# Patient Record
Sex: MALE | Race: WHITE | URBAN - METROPOLITAN AREA
[De-identification: names, ages, dates, MRNs, and addresses within clinical notes are randomized per-mention and may not be internally consistent; named-entity substitution may affect disease eponyms.]

---

## 2020-01-13 NOTE — PROGRESS NOTES
Arpan Conti   26 y.o. male   1990    HPI:  Chief Complaint   Patient presents with    New Patient     This is patient's first visit with me. He is originally moved from [facility] about a year ago and works for the [employer] in [employer]. Patient's main problems is GERD, which started in 2012. Has relief with Omeprazole, which he takes on a nearly daily basis. Main trigger is tomato based food. Has few nighttime awakenings. No referral was made to GI for a EGD. No history of HTN or other medical issues that required chronic meds. STOP-BANG:  -Snoring - no  -Tired frequently - no  -Observed apnea - unsure (patient is single)  -Pressure (blood) - no  -BMI > 35 - yes  -Age > 50 - no  -Neck circumference - did not perform (Male - > 37 cm)-Gender - male    STOP-BANG risk score = 2 (0-2 = low; 3-4 = intermediate; 5 or higher = high-risk)    [place] sleepiness score = normal sleepiness    FMH - yes     No Known Allergies  Current Outpatient Medications on File Prior to Visit   Medication Sig Dispense Refill    Multiple Vitamins-Minerals (ADULT ONE DAILY GUMMIES PO) Take by mouth       No current facility-administered medications on file prior to visit. Family History   Problem Relation Age of Onset    Cancer Maternal Grandfather         prostate, bone    Atrial Fibrillation Paternal Grandmother     Other Paternal Grandmother         colitis    Diabetes Maternal Uncle     Cancer Paternal Great Grandmother         esophageal cancer     Social History     Tobacco Use    Smoking status: Never Smoker    Smokeless tobacco: Never Used   Substance Use Topics    Alcohol use: Not Currently      Review of Systems   Constitutional: Negative for activity change, appetite change, fatigue, fever and unexpected weight change. HENT: Negative for congestion, rhinorrhea, sinus pressure and trouble swallowing.     Respiratory: Negative for cough, chest tightness, shortness of breath and wheezing. Cardiovascular: Negative for chest pain, palpitations and leg swelling. Gastrointestinal: Negative for abdominal distention, abdominal pain, blood in stool, constipation, diarrhea, nausea and vomiting. Genitourinary: Negative for dysuria, frequency and hematuria. Musculoskeletal: Negative for arthralgias and back pain. Skin: Negative for rash. Neurological: Negative for dizziness, weakness, light-headedness, numbness and headaches. Wt Readings from Last 3 Encounters:   01/16/20 259 lb (117.5 kg)     BP Readings from Last 3 Encounters:   01/16/20 110/78     Pulse Readings from Last 3 Encounters:   01/16/20 88     /78   Pulse 88   Resp 18   Ht 5' 8\" (1.727 m)   Wt 259 lb (117.5 kg)   SpO2 98%   BMI 39.38 kg/m²    Physical Exam  Vitals signs reviewed. Constitutional:       General: He is not in acute distress. Appearance: He is obese. He is not ill-appearing. HENT:      Head: Normocephalic and atraumatic. Right Ear: External ear normal.      Left Ear: External ear normal.      Nose: Nose normal.   Eyes:      Extraocular Movements: Extraocular movements intact. Conjunctiva/sclera: Conjunctivae normal.   Neck:      Musculoskeletal: Normal range of motion. Cardiovascular:      Rate and Rhythm: Normal rate and regular rhythm. Pulmonary:      Effort: Pulmonary effort is normal. No respiratory distress. Breath sounds: No wheezing, rhonchi or rales. Abdominal:      General: Abdomen is flat. There is no distension. Palpations: Abdomen is soft. Musculoskeletal: Normal range of motion. General: No deformity. Right lower leg: No edema. Left lower leg: No edema. Skin:     Coloration: Skin is not jaundiced or pale. Findings: No erythema or rash. Neurological:      General: No focal deficit present. Mental Status: He is alert. Mental status is at baseline.    Psychiatric:         Mood and Affect: Mood normal.         Behavior:

## 2020-01-16 ENCOUNTER — OFFICE VISIT (OUTPATIENT)
Dept: FAMILY MEDICINE CLINIC | Age: 30
End: 2020-01-16
Payer: COMMERCIAL

## 2020-01-16 VITALS
WEIGHT: 259 LBS | RESPIRATION RATE: 18 BRPM | SYSTOLIC BLOOD PRESSURE: 110 MMHG | BODY MASS INDEX: 39.25 KG/M2 | HEIGHT: 68 IN | DIASTOLIC BLOOD PRESSURE: 78 MMHG | OXYGEN SATURATION: 98 % | HEART RATE: 88 BPM

## 2020-01-16 PROBLEM — K21.9 GASTROESOPHAGEAL REFLUX DISEASE WITHOUT ESOPHAGITIS: Status: ACTIVE | Noted: 2020-01-16

## 2020-01-16 PROCEDURE — 90471 IMMUNIZATION ADMIN: CPT | Performed by: FAMILY MEDICINE

## 2020-01-16 PROCEDURE — 99204 OFFICE O/P NEW MOD 45 MIN: CPT | Performed by: FAMILY MEDICINE

## 2020-01-16 PROCEDURE — 90688 IIV4 VACCINE SPLT 0.5 ML IM: CPT | Performed by: FAMILY MEDICINE

## 2020-01-16 RX ORDER — OMEPRAZOLE 40 MG/1
CAPSULE, DELAYED RELEASE ORAL
COMMUNITY
Start: 2019-12-20 | End: 2020-01-16 | Stop reason: SDUPTHER

## 2020-01-16 RX ORDER — OMEPRAZOLE 40 MG/1
40 CAPSULE, DELAYED RELEASE ORAL DAILY
Qty: 90 CAPSULE | Refills: 1 | Status: SHIPPED | OUTPATIENT
Start: 2020-01-16 | End: 2020-07-20 | Stop reason: SDUPTHER

## 2020-01-16 RX ORDER — TOPIRAMATE 25 MG/1
25 TABLET ORAL NIGHTLY
Qty: 30 TABLET | Refills: 2 | Status: SHIPPED | OUTPATIENT
Start: 2020-01-16 | End: 2020-12-28

## 2020-01-16 ASSESSMENT — ENCOUNTER SYMPTOMS
WHEEZING: 0
TROUBLE SWALLOWING: 0
SHORTNESS OF BREATH: 0
SINUS PRESSURE: 0
BACK PAIN: 0
COUGH: 0
ABDOMINAL DISTENTION: 0
CONSTIPATION: 0
CHEST TIGHTNESS: 0
DIARRHEA: 0
RHINORRHEA: 0
BLOOD IN STOOL: 0
ABDOMINAL PAIN: 0
VOMITING: 0
NAUSEA: 0

## 2020-01-16 NOTE — PATIENT INSTRUCTIONS
Patient Education        Gastroesophageal Reflux Disease (GERD): Care Instructions  Your Care Instructions    Gastroesophageal reflux disease (GERD) is the backward flow of stomach acid into the esophagus. The esophagus is the tube that leads from your throat to your stomach. A one-way valve prevents the stomach acid from moving up into this tube. When you have GERD, this valve does not close tightly enough. If you have mild GERD symptoms including heartburn, you may be able to control the problem with antacids or over-the-counter medicine. Changing your diet, losing weight, and making other lifestyle changes can also help reduce symptoms. Follow-up care is a key part of your treatment and safety. Be sure to make and go to all appointments, and call your doctor if you are having problems. It's also a good idea to know your test results and keep a list of the medicines you take. How can you care for yourself at home? · Take your medicines exactly as prescribed. Call your doctor if you think you are having a problem with your medicine. · Your doctor may recommend over-the-counter medicine. For mild or occasional indigestion, antacids, such as Tums, Gaviscon, Mylanta, or Maalox, may help. Your doctor also may recommend over-the-counter acid reducers, such as Pepcid AC, Tagamet HB, Zantac 75, or Prilosec. Read and follow all instructions on the label. If you use these medicines often, talk with your doctor. · Change your eating habits. ? It's best to eat several small meals instead of two or three large meals. ? After you eat, wait 2 to 3 hours before you lie down. ? Chocolate, mint, and alcohol can make GERD worse. ? Spicy foods, foods that have a lot of acid (like tomatoes and oranges), and coffee can make GERD symptoms worse in some people. If your symptoms are worse after you eat a certain food, you may want to stop eating that food to see if your symptoms get better.   · Do not smoke or chew tobacco. Smoking can make GERD worse. If you need help quitting, talk to your doctor about stop-smoking programs and medicines. These can increase your chances of quitting for good. · If you have GERD symptoms at night, raise the head of your bed 6 to 8 inches by putting the frame on blocks or placing a foam wedge under the head of your mattress. (Adding extra pillows does not work.)  · Do not wear tight clothing around your middle. · Lose weight if you need to. Losing just 5 to 10 pounds can help. When should you call for help? Call your doctor now or seek immediate medical care if:    · You have new or different belly pain.     · Your stools are black and tarlike or have streaks of blood.    Watch closely for changes in your health, and be sure to contact your doctor if:    · Your symptoms have not improved after 2 days.     · Food seems to catch in your throat or chest.   Where can you learn more? Go to https://"[employer].At [employer]. org and sign in to your [employer] account. Enter F822 in the [employer] box to learn more about \"Gastroesophageal Reflux Disease (GERD): Care Instructions. \"     If you do not have an account, please click on the \"Sign Up Now\" link. Current as of: August 11, 2019  Content Version: 12.3  © 3246-9453 Healthwise, Incorporated. Care instructions adapted under license by [facility] [place] ([facility]). If you have questions about a medical condition or this instruction, always ask your healthcare professional. [place] [address] any warranty or liability for your use of this information. Patient Education        Learning About the Mediterranean Diet  What is the [address]? The Mediterranean diet is a style of eating rather than a diet plan. It features foods eaten in [place], [place], [place] and [place], and other countries along the [place].  It emphasizes eating foods like fish, fruits, vegetables, beans, high-fiber breads and whole grains, meat. Brush the fish with olive oil, and broil or grill it. · Sprinkle your salad with seeds or nuts instead of cheese. · Cook with olive or canola oil instead of butter or oils that are high in saturated fat. · Switch from 2% milk or whole milk to 1% or fat-free milk. · Dip raw vegetables in a vinaigrette dressing or hummus instead of dips made from mayonnaise or sour cream.  · Have a piece of fruit for dessert instead of a piece of cake. Try baked apples, or have some dried fruit. Tips for eating out  · Try broiled, grilled, baked, or poached fish instead of having it fried or breaded. · Ask your  to have your meals prepared with olive oil instead of butter. · Order dishes made with marinara sauce or sauces made from olive oil. Avoid sauces made from cream or mayonnaise. · Choose whole-grain breads, whole wheat pasta and pizza crust, brown rice, beans, and lentils. · Cut back on butter or margarine on bread. Instead, you can dip your bread in a small amount of olive oil. · Ask for a side salad or grilled vegetables instead of french fries or chips. Where can you learn more? Go to https://[employer]eng[employer]. org and sign in to your [employer] account. Enter [phone] in the [facility] box to learn more about \"Learning About the Mediterranean Diet. \"     If you do not have an account, please click on the \"Sign Up Now\" link. Current as of: August 21, 2019  Content Version: 12.3  © 3393-0680 Healthwise, [employer]. Care instructions adapted under license by [facility] ([facility]). If you have questions about a medical condition or this instruction, always ask your healthcare professional. [address]  any warranty or liability for your use of this information.

## 2020-01-16 NOTE — PROGRESS NOTES
Vaccine Information Sheet, \"Influenza - Inactivated\"  given to Arpan Conti, or parent/legal guardian of  Arpan Conti and verbalized understanding. Patient responses:    Have you ever had a reaction to a flu vaccine? No  Do you have any current illness? No  Have you ever had Guillian Cossayuna Syndrome? No  Do you have a serious allergy to any of the follow: Neomycin, Polymyxin, Thimerosal, eggs or egg products? No    Flu vaccine given per order. Please see immunization tab. Risks and benefits explained. Current VIS given.       Immunizations Administered     Name Date Dose Route    Influenza, Quadv, IM, (6 mo and older Fluzone, Flulaval, Fluarix and 3 yrs and older Afluria) 1/16/2020 0.5 mL Intramuscular    Site: Deltoid- Left    Lot: [id number]    NDC: 41306-032-12

## 2020-04-07 ENCOUNTER — TELEPHONE (OUTPATIENT)
Dept: FAMILY MEDICINE CLINIC | Age: 30
End: 2020-04-07

## 2020-07-10 NOTE — PROGRESS NOTES
Kiran Quiet   26 y.o. male   1990    HPI:  Chief Complaint   Patient presents with    Gastroesophageal Reflux     Virtual visit encounter type:  [x] Doxy. me  [] Telephone w/o video  [] MyChart  [] Other: This patient was last seen by me on 1/16/2020. This was his very first visit with me to establish care as his new PCP. During that visit, I started him on omeprazole 40 mg daily for GERD and topiramate 25 mg to help promote weight loss. Patient stated that he for the most part takes omeprazole 40 mg on a daily basis to control his GERD, but it is effective if he continues to take it. He he said that his GERD is overall stable and not worse since his last visit. Regarding taking topiramate, patient stated that 2 days after he started taking the medication that he started feeling sick with migraines, nasal congestion, sore throat, etc.  H said that it took him over 4 weeks to fully recover. He was not sure if this was related to the medication so he stopped. No other issues or concerns were brought up by patient during today's visit. No Known Allergies     Current Outpatient Medications on File Prior to Visit   Medication Sig Dispense Refill    Multiple Vitamins-Minerals (ADULT ONE DAILY GUMMIES PO) Take by mouth      omeprazole (PRILOSEC) 40 MG delayed release capsule Take 1 capsule by mouth daily 90 capsule 1    topiramate (TOPAMAX) 25 MG tablet Take 1 tablet by mouth nightly (Patient not taking: Reported on 7/13/2020) 30 tablet 2     No current facility-administered medications on file prior to visit.        Family History   Problem Relation Age of Onset    Cancer Maternal Grandfather         prostate, bone    Atrial Fibrillation Paternal Grandmother     Other Paternal Grandmother         colitis    Diabetes Maternal Uncle     Cancer Paternal Great Grandmother         esophageal cancer     Social History     Tobacco Use    Smoking status: Never Smoker    Smokeless tobacco: Never Used   Substance Use Topics    Alcohol use: Not Currently      Review of Systems   Constitutional: Negative for activity change, appetite change, fatigue, fever and unexpected weight change. HENT: Negative for congestion, rhinorrhea, sinus pressure and trouble swallowing. Respiratory: Negative for cough, chest tightness, shortness of breath and wheezing. Cardiovascular: Negative for chest pain, palpitations and leg swelling. Gastrointestinal: Negative for abdominal distention, abdominal pain, blood in stool, constipation, diarrhea, nausea and vomiting. Genitourinary: Negative for dysuria, frequency and hematuria. Musculoskeletal: Negative for arthralgias and back pain. Skin: Negative for rash. Neurological: Negative for dizziness, weakness, light-headedness, numbness and headaches. Wt Readings from Last 3 Encounters:   01/16/20 259 lb (117.5 kg)     BP Readings from Last 3 Encounters:   01/16/20 110/78     Pulse Readings from Last 3 Encounters:   01/16/20 88     There were no vitals taken for this visit. Physical Exam  Constitutional:       General: He is awake. He is not in acute distress. Appearance: Normal appearance. He is obese. He is not ill-appearing. HENT:      Head: Normocephalic and atraumatic. Right Ear: External ear normal.      Left Ear: External ear normal.      Nose: Nose normal.   Eyes:      General: No scleral icterus. Right eye: No discharge. Left eye: No discharge. Extraocular Movements: Extraocular movements intact. Conjunctiva/sclera: Conjunctivae normal.      Pupils: Pupils are equal, round, and reactive to light. Neck:      Musculoskeletal: Normal range of motion. No erythema. Pulmonary:      Effort: Pulmonary effort is normal. No respiratory distress. Breath sounds: No stridor. No wheezing. Abdominal:      General: There is no distension. Musculoskeletal: Normal range of motion.    Skin:     Coloration: Skin is not cyanotic, jaundiced or pale. Findings: No erythema. Neurological:      General: No focal deficit present. Mental Status: He is alert and oriented to person, place, and time. Mental status is at baseline. Psychiatric:         Attention and Perception: Attention and perception normal.         Mood and Affect: Mood and affect normal.         Speech: Speech normal.         Behavior: Behavior normal. Behavior is cooperative. Thought Content: Thought content normal.       Assessment/Plan:     Kandace Harley was seen today for gastroesophageal reflux. Diagnoses and all orders for this visit:    Gastroesophageal reflux disease without esophagitis  Comments:  Controlled on omeprazole alone. Continue current regimen. Advise avoid any GERD related triggers or minimize exposure as much as possible. BMI 39.0-39.9,adult  Comments: We discussed about consuming smaller meals with emphasis of low calorie/carb/sugar/sodium, etc.  Advised to exercise at least 150 minutes/wk. Patient stated that he will resume topiramate therapy along with patient education provided before. We also discussed about the multiple indications for topiramate therapy in addition to promoting weight loss. He was advised to get blood work done as ordered back in January 2020. Orders:  -     Comprehensive Metabolic Panel; Future  -     Hemoglobin A1C; Future  -     CBC Auto Differential; Future  -     Lipid Panel; Future  -     TSH without Reflex; Future       There are no discontinued medications. Patient was informed that whether starting and/or adding a new medication as well as continuing any current medication(s) that the benefits and risks have to be considered and weighed over. Patient was also informed that there are no medications that has no side effects. The most common adverse effects of medications were addressed at today's visit.     Estimated length of time of today's visit: 15 minutes    Follow-up: Return in about 4 months (around 11/13/2020) for check weight, flu shot. . Patient was informed that if his or her symptoms worsen to return here or go to nearest ER if symptoms significantly worsen. Disclaimer:  Rylee Alexis is a 26 y.o. male is being evaluated by a virtual visit (video visit) or telephone (without video) encounter to address their concern(s) as mentioned above. Prior to arranging this appointment, the patient was made aware of the need and importance to schedule the visit in this manner given the current ongoing COVID-19 pandemic. After this discussion, the patient acknowledged understanding and agreed to today's virtual visit encounter. A caregiver was present when appropriate. Due to this being a TeleHealth encounter (during the EUWZQ-87 public health emergency), evaluation of the following organ systems was overall limited: Vitals/Constitutional/EENT/Resp/CV/GI//MS/Neuro/Skin/Heme-Lymph-Imm. As a result, establishing a diagnosis(s) and formulating a plan of care may be overall more difficult and complicated compared to conventional (face-to-face) office visits. Pursuant to the emergency declaration under the [address] [place] [address], [address] [place] [address] authority and the [employer] Act, this virtual visit was conducted with the patient's (and/or legal guardian's) consent, to reduce the patient's risk (as well as others) of exposure to COVID-19 and to continue to maintain and provide necessary medical care. The patient (and/or legal guardian) has also been advised to contact this office for worsening conditions or problems, and seek emergency medical treatment and/or call 911 if deemed necessary. Services were provided through either a video synchronous discussion virtually or via telephone (without video) to substitute for in-person clinic visit.  Patient and provider were located at their individual home(s) or their [facility] convenient location at the time of visit. Magno Crump MD on 7/13/2020 at 3:02 PM  [address]     An electronic signature was used to authenticate this note.

## 2020-07-13 ENCOUNTER — VIRTUAL VISIT (OUTPATIENT)
Dept: FAMILY MEDICINE CLINIC | Age: 30
End: 2020-07-13
Payer: COMMERCIAL

## 2020-07-13 PROCEDURE — 99213 OFFICE O/P EST LOW 20 MIN: CPT | Performed by: FAMILY MEDICINE

## 2020-07-13 ASSESSMENT — PATIENT HEALTH QUESTIONNAIRE - PHQ9
SUM OF ALL RESPONSES TO PHQ QUESTIONS 1-9: 0
SUM OF ALL RESPONSES TO PHQ QUESTIONS 1-9: 0
SUM OF ALL RESPONSES TO PHQ9 QUESTIONS 1 & 2: 0
2. FEELING DOWN, DEPRESSED OR HOPELESS: 0
1. LITTLE INTEREST OR PLEASURE IN DOING THINGS: 0

## 2020-07-13 ASSESSMENT — ENCOUNTER SYMPTOMS
VOMITING: 0
CONSTIPATION: 0
BACK PAIN: 0
NAUSEA: 0
BLOOD IN STOOL: 0
COUGH: 0
ABDOMINAL DISTENTION: 0
RHINORRHEA: 0
DIARRHEA: 0
WHEEZING: 0
CHEST TIGHTNESS: 0
SINUS PRESSURE: 0
TROUBLE SWALLOWING: 0
SHORTNESS OF BREATH: 0
ABDOMINAL PAIN: 0

## 2020-07-20 RX ORDER — OMEPRAZOLE 40 MG/1
40 CAPSULE, DELAYED RELEASE ORAL DAILY
Qty: 90 CAPSULE | Refills: 1 | OUTPATIENT
Start: 2020-07-20

## 2020-07-20 RX ORDER — OMEPRAZOLE 40 MG/1
40 CAPSULE, DELAYED RELEASE ORAL DAILY
Qty: 90 CAPSULE | Refills: 1 | Status: SHIPPED | OUTPATIENT
Start: 2020-07-20 | End: 2020-12-28 | Stop reason: SDUPTHER

## 2020-07-20 NOTE — TELEPHONE ENCOUNTER
Pt needs omeprazole 40mg refilled. Was not ordered at last appt.      LRX: 1/16/20  LVV: 7/13/20  Next appt: Nothing scheduled

## 2020-12-28 ENCOUNTER — VIRTUAL VISIT (OUTPATIENT)
Dept: FAMILY MEDICINE CLINIC | Age: 30
End: 2020-12-28
Payer: COMMERCIAL

## 2020-12-28 PROCEDURE — 99212 OFFICE O/P EST SF 10 MIN: CPT | Performed by: FAMILY MEDICINE

## 2020-12-28 RX ORDER — OMEPRAZOLE 40 MG/1
40 CAPSULE, DELAYED RELEASE ORAL DAILY
Qty: 90 CAPSULE | Refills: 1 | Status: SHIPPED | OUTPATIENT
Start: 2020-12-28

## 2020-12-28 RX ORDER — METHYLPREDNISOLONE 4 MG/1
TABLET ORAL
Qty: 1 KIT | Refills: 0 | Status: SHIPPED | OUTPATIENT
Start: 2020-12-28

## 2020-12-28 RX ORDER — PENICILLIN V POTASSIUM 500 MG/1
500 TABLET ORAL 4 TIMES DAILY
Qty: 40 TABLET | Refills: 0 | Status: SHIPPED | OUTPATIENT
Start: 2020-12-28 | End: 2021-01-07

## 2020-12-28 ASSESSMENT — ENCOUNTER SYMPTOMS
SINUS PRESSURE: 0
BACK PAIN: 0
BLOOD IN STOOL: 0
RHINORRHEA: 0
VOMITING: 0
SHORTNESS OF BREATH: 0
COUGH: 0
TROUBLE SWALLOWING: 0
WHEEZING: 0
CONSTIPATION: 0
ABDOMINAL DISTENTION: 0
CHEST TIGHTNESS: 0
NAUSEA: 0
SORE THROAT: 1
DIARRHEA: 0
ABDOMINAL PAIN: 0

## 2020-12-28 NOTE — PROGRESS NOTES
Melchor De Jesus   31 y.o. male   1990    Virtual visit encounter type:   [x] Doxy. me  [] Telephone w/o video  [] MyChart  [] Other: This patient was last seen by me on 7/13/2020. HPI:  Chief Complaint   Patient presents with    Other     Sore tonsils. Started 12/20. White spots. Tonsils look swollen. Patient reported of having discomfort at the back of his throat since around Dec 20th. Initially, he saw small white spots on both tonsils, but have gradually become more noticeable as well as subsequent erythema of his tonsils and pharynx. He denied fever, dysphagia, odynophagia, cough, nausea, emesis, increased GERD, regurgitation. He also denied SOB, wheezing, or bronchoconstriction/bronchospasm. He's tolerating a regular diet well. He has history of GERD and for as long as he takes Omeprazole, he's fine. Omeprazole is highly effective and desires to continue taking it. No Known Allergies    Current Outpatient Medications on File Prior to Visit   Medication Sig Dispense Refill    Multiple Vitamins-Minerals (ADULT ONE DAILY GUMMIES PO) Take by mouth       No current facility-administered medications on file prior to visit. Family History   Problem Relation Age of Onset    Cancer Maternal Grandfather         prostate, bone    Atrial Fibrillation Paternal Grandmother     Other Paternal Grandmother         colitis    Diabetes Maternal Uncle     Cancer Paternal Great Grandmother         esophageal cancer     Social History     Tobacco Use    Smoking status: Never Smoker    Smokeless tobacco: Never Used   Substance Use Topics    Alcohol use: Not Currently      Review of Systems   Constitutional: Negative for activity change, appetite change, chills, fatigue, fever and unexpected weight change. HENT: Positive for sore throat. Negative for congestion, rhinorrhea, sinus pressure and trouble swallowing. Respiratory: Negative for cough, chest tightness, shortness of breath and wheezing. Cardiovascular: Negative for chest pain, palpitations and leg swelling. Gastrointestinal: Negative for abdominal distention, abdominal pain, blood in stool, constipation, diarrhea, nausea and vomiting. Genitourinary: Negative for dysuria, frequency and hematuria. Musculoskeletal: Negative for arthralgias and back pain. Skin: Negative for rash. Neurological: Negative for dizziness, weakness, light-headedness, numbness and headaches. Wt Readings from Last 3 Encounters:   01/16/20 259 lb (117.5 kg)     BP Readings from Last 3 Encounters:   01/16/20 110/78     Pulse Readings from Last 3 Encounters:   01/16/20 88       There were no vitals taken for this visit. Physical Exam  Vitals signs reviewed. Constitutional:       General: He is awake. He is not in acute distress. Appearance: Normal appearance. He is obese. He is not ill-appearing. HENT:      Head: Normocephalic and atraumatic. Right Ear: External ear normal.      Left Ear: External ear normal.      Nose: Nose normal.      Mouth/Throat:      Mouth: No oral lesions. Dentition: No gum lesions. Tongue: No lesions. Tongue does not deviate from midline. Pharynx: Pharyngeal swelling and posterior oropharyngeal erythema present. No oropharyngeal exudate or uvula swelling. Tonsils: Tonsillar exudate present. Eyes:      General: No scleral icterus. Right eye: No discharge. Left eye: No discharge. Extraocular Movements: Extraocular movements intact. Conjunctiva/sclera: Conjunctivae normal.   Neck:      Musculoskeletal: Normal range of motion. No erythema. Pulmonary:      Effort: Pulmonary effort is normal. No respiratory distress. Breath sounds: No stridor. No wheezing. Abdominal:      General: There is no distension. Musculoskeletal: Normal range of motion.    Skin: Coloration: Skin is not cyanotic, jaundiced or pale. Findings: No erythema. Neurological:      General: No focal deficit present. Mental Status: He is alert and oriented to person, place, and time. Mental status is at baseline. Psychiatric:         Attention and Perception: Attention and perception normal.         Mood and Affect: Mood and affect normal.         Speech: Speech normal.         Behavior: Behavior normal. Behavior is cooperative. Thought Content: Thought content normal.        Assessment/Plan:   Nacho Cortes was seen today for other. Diagnoses and all orders for this visit:    Acute suppurative pharyngitis  -     penicillin v potassium (VEETID) 500 MG tablet; Take 1 tablet by mouth 4 times daily for 10 days  -     methylPREDNISolone (MEDROL DOSEPACK) 4 MG tablet; Take by mouth as instructed    Gastroesophageal reflux disease without esophagitis  Comments:  Controlled on omeprazole. Continue to take as needed. Orders:  -     omeprazole (PRILOSEC) 40 MG delayed release capsule;  Take 1 capsule by mouth daily    Educated about 2019 novel coronavirus infection  Comments: Educational information was provided to the patient regarding the signs and symptoms associated with COVID-19. Patient was also made aware that COVID-19 has a variable presentation and is highly contagious. Patient was informed that COVID-19 is mainly contracted by being in close proximity (via respiratory droplets) to an infected person with COVID-19 and that the virus can be transmitted even if one is asymptomatic. The incubation period is 5-6 days. Patient was also made aware that the virus can be transmitted via oral/body secretions. Patient was advised to wear a mask and gloves especially in large public settings (e.g. grocery store) and maintain social distancing (> 6 feet apart) as much as possible. Patient was informed if one has any symptoms consistent with COVID-19 were to develop or if one has been exposed to a person, who tested positive for COVID-19 to go to one of [facility]'s nearest flu clinics to be evaluated and possibly be tested for COVID-19 (based on the discretion of the healthcare evaluator) or seek care with their PCP (via telemedicine), urgent care, or ER. Results may take 2-5 days (or even longer) depending on how and where the patient was tested. The patient was advised to make arrangements with family and/or friends to obtaining basic necessities (eg groceries) and that it is recommended that no one visit within the household to prevent possible exposure and transmission to others. Based on current CDC guidelines, at least 24 hours of being afebrile as well as improvement of overall symptoms is highly recommended before returning to work and minimum of 7-10 days (or longer) has elapsed since symptoms first started. Patient was informed that NSAIDs as well as Tylenol are not just analgesics, but are also anti-pyretics and should be off them for at least 12-24 hours to truly know if one has a fever or not.   Patient was also informed that repeat COVID-19 testing for most people is not recommended per current CDC guidelines  Regarding the recovery process, the patient was informed that each person is different and for those with mild presentation of COVID-19 may recover within 2 to 3 weeks of infection while there is with moderate and more severe cases of COVID-19 may take well over 3 weeks or even months to recover. The patient was encouraged to follow-up as directed with their PCP. I reviewed the plan of care with the patient. Patient acknowledged understanding and agreed with plan of care overall. Medications Discontinued During This Encounter   Medication Reason    topiramate (TOPAMAX) 25 MG tablet LIST CLEANUP    omeprazole (PRILOSEC) 40 MG delayed release capsule REORDER        Patient was informed that whether starting or adding a new medication or increasing the dose of a current medication, the benefits and risks have to be considered and weighed over, especially if the patient is taking other medications as well. Patient was also informed that there are no medications that have no side effects and there is always a risk involved with taking a medication. If a side effect were to occur with starting a new medication or with increasing the dose of a current medication that either the medication can be totally discontinued altogether or simply decrease the dose of it and based on this, a follow-up appointment is necessary. The drug allergy list will then be updated with the corresponding side effects if it's deemed to be a true 'drug allergy'. The most common adverse effects of medication(s) were addressed at today's visit. Lastly, the patient was informed that the coverage status of a medication may vary from insurance to insurance and the only way to verify if the medication is covered is to send an actual prescription in. The patient was also informed that the cost of medications vary from insurance to insurance. Estimated length of time of today's visit: 15 minutes    Follow-up: Return if symptoms worsen or fail to improve. . Patient was informed that if his or her symptoms worsen to return here or go to nearest ER if symptoms significantly worsen. General disclaimer regarding telemedicine (virtual) visits:  Rhett Nguyen is a 31 y.o. male is being evaluated by a virtual visit (video visit) and/or telephone (without video) encounter to address their concern(s) as mentioned above. Prior to arranging this appointment, the patient was made aware of the need and importance to schedule the visit in this manner given the current ongoing COVID-19 pandemic. The patient was also made aware that the telemedicine service used (e.g.doxy. me) would not save let alone record any information (audio, video, and text) regarding the actual virtual visit. After this discussion, the patient acknowledged understanding and agreed to today's virtual visit encounter. A caregiver was present when appropriate as well as an  if requested. Due to this being a TeleHealth encounter (during the [place]-37 public health emergency), evaluation of the following organ systems was overall limited: Vitals/Constitutional/EENT/Resp/CV/GI//MS/Neuro/Skin/Heme-Lymph-Imm. As a result, establishing a diagnosis(s) and formulating a plan of care may be overall more difficult and complicated compared to a conventional (face-to-face) office visit. The patient was made aware about the potential limitations and difficulties of a telemedicine visit such as having technical difficulties related to video and/or audio issues often stemming from a sub-optimal/poor Internet or cellular data connection and/or other factors. If this is judged to be the case then the patient would be informed if deemed relevant and necessary that an in-person follow-up visit may be recommended. It is worth noting that most of the time, notes are completed usually long after the patient is seen and are strived to be completed in a timely fashion (ideally within 72 hours of the patient encounter). It is also worth noting that healthcare providers often see several patients a day (e.g. > 15-30 patients/day) in either the outpatient and/or inpatient setting(s). In addition, healthcare providers spend a significant amount of time reviewing multiple patients' charts in preparation for their future encounters (pre-charting) as well as time spent reviewing any labs, imaging, specialist's notes (if relevant), and other documents (e.g. recent ER visits or hospital stays or completing forms such as FMLA, short-term/long-term disability), etc.  Time and effort is also allocated to coordinating with office staff to make sure various things are completed as part of the day-to-day office management responsibilities. Given all this, it is worth pointing out that not every detail can be remembered and not everything discussed is considered relevant, significant, or noteworthy. If one has any questions or concerns about my given note, please take into consideration all these aforementioned things before contacting. Magno Jaffe M.D.   [id number]    Electronically signed by Aurelio Stock on 12/28/2020 at 10:56 PM.

## 2023-04-20 ENCOUNTER — OFFICE VISIT (OUTPATIENT)
Dept: FAMILY MEDICINE CLINIC | Age: 33
End: 2023-04-20
Payer: COMMERCIAL

## 2023-04-20 VITALS
DIASTOLIC BLOOD PRESSURE: 80 MMHG | HEART RATE: 101 BPM | HEIGHT: 68 IN | BODY MASS INDEX: 35.8 KG/M2 | WEIGHT: 236.2 LBS | OXYGEN SATURATION: 98 % | SYSTOLIC BLOOD PRESSURE: 112 MMHG

## 2023-04-20 DIAGNOSIS — Z13.21 SCREENING FOR ENDOCRINE, NUTRITIONAL, METABOLIC AND IMMUNITY DISORDER: ICD-10-CM

## 2023-04-20 DIAGNOSIS — Z13.29 SCREENING FOR ENDOCRINE, NUTRITIONAL, METABOLIC AND IMMUNITY DISORDER: ICD-10-CM

## 2023-04-20 DIAGNOSIS — Z13.228 SCREENING FOR ENDOCRINE, NUTRITIONAL, METABOLIC AND IMMUNITY DISORDER: ICD-10-CM

## 2023-04-20 DIAGNOSIS — E66.09 CLASS 2 OBESITY DUE TO EXCESS CALORIES WITHOUT SERIOUS COMORBIDITY WITH BODY MASS INDEX (BMI) OF 35.0 TO 35.9 IN ADULT: ICD-10-CM

## 2023-04-20 DIAGNOSIS — Z13.0 SCREENING FOR ENDOCRINE, NUTRITIONAL, METABOLIC AND IMMUNITY DISORDER: ICD-10-CM

## 2023-04-20 DIAGNOSIS — Z00.00 ENCOUNTER FOR PREVENTATIVE ADULT HEALTH CARE EXAMINATION: Primary | ICD-10-CM

## 2023-04-20 PROCEDURE — 99395 PREV VISIT EST AGE 18-39: CPT | Performed by: FAMILY MEDICINE

## 2023-04-20 RX ORDER — SEMAGLUTIDE 0.25 MG/.5ML
0.25 INJECTION, SOLUTION SUBCUTANEOUS
Qty: 2 ML | Refills: 1 | Status: SHIPPED | OUTPATIENT
Start: 2023-04-20

## 2023-04-20 SDOH — ECONOMIC STABILITY: FOOD INSECURITY: WITHIN THE PAST 12 MONTHS, YOU WORRIED THAT YOUR FOOD WOULD RUN OUT BEFORE YOU GOT MONEY TO BUY MORE.: NEVER TRUE

## 2023-04-20 SDOH — ECONOMIC STABILITY: INCOME INSECURITY: HOW HARD IS IT FOR YOU TO PAY FOR THE VERY BASICS LIKE FOOD, HOUSING, MEDICAL CARE, AND HEATING?: NOT HARD AT ALL

## 2023-04-20 SDOH — ECONOMIC STABILITY: FOOD INSECURITY: WITHIN THE PAST 12 MONTHS, THE FOOD YOU BOUGHT JUST DIDN'T LAST AND YOU DIDN'T HAVE MONEY TO GET MORE.: NEVER TRUE

## 2023-04-20 SDOH — ECONOMIC STABILITY: HOUSING INSECURITY
IN THE LAST 12 MONTHS, WAS THERE A TIME WHEN YOU DID NOT HAVE A STEADY PLACE TO SLEEP OR SLEPT IN A SHELTER (INCLUDING NOW)?: NO

## 2023-04-20 ASSESSMENT — PATIENT HEALTH QUESTIONNAIRE - PHQ9
1. LITTLE INTEREST OR PLEASURE IN DOING THINGS: 0
SUM OF ALL RESPONSES TO PHQ9 QUESTIONS 1 & 2: 0
7. TROUBLE CONCENTRATING ON THINGS, SUCH AS READING THE NEWSPAPER OR WATCHING TELEVISION: 0
SUM OF ALL RESPONSES TO PHQ QUESTIONS 1-9: 0
3. TROUBLE FALLING OR STAYING ASLEEP: 0
2. FEELING DOWN, DEPRESSED OR HOPELESS: 0
SUM OF ALL RESPONSES TO PHQ QUESTIONS 1-9: 0
SUM OF ALL RESPONSES TO PHQ QUESTIONS 1-9: 0
10. IF YOU CHECKED OFF ANY PROBLEMS, HOW DIFFICULT HAVE THESE PROBLEMS MADE IT FOR YOU TO DO YOUR WORK, TAKE CARE OF THINGS AT HOME, OR GET ALONG WITH OTHER PEOPLE: 0
5. POOR APPETITE OR OVEREATING: 0
8. MOVING OR SPEAKING SO SLOWLY THAT OTHER PEOPLE COULD HAVE NOTICED. OR THE OPPOSITE, BEING SO FIGETY OR RESTLESS THAT YOU HAVE BEEN MOVING AROUND A LOT MORE THAN USUAL: 0
9. THOUGHTS THAT YOU WOULD BE BETTER OFF DEAD, OR OF HURTING YOURSELF: 0
SUM OF ALL RESPONSES TO PHQ QUESTIONS 1-9: 0
6. FEELING BAD ABOUT YOURSELF - OR THAT YOU ARE A FAILURE OR HAVE LET YOURSELF OR YOUR FAMILY DOWN: 0
4. FEELING TIRED OR HAVING LITTLE ENERGY: 0

## 2023-04-20 ASSESSMENT — ENCOUNTER SYMPTOMS
CONSTIPATION: 0
WHEEZING: 0
VOMITING: 0
COUGH: 0
SHORTNESS OF BREATH: 0
TROUBLE SWALLOWING: 0
RHINORRHEA: 0
SINUS PRESSURE: 0
BACK PAIN: 0
NAUSEA: 0
BLOOD IN STOOL: 0
CHEST TIGHTNESS: 0
ABDOMINAL PAIN: 0
DIARRHEA: 0
ABDOMINAL DISTENTION: 0

## 2023-04-20 ASSESSMENT — ANXIETY QUESTIONNAIRES
IF YOU CHECKED OFF ANY PROBLEMS ON THIS QUESTIONNAIRE, HOW DIFFICULT HAVE THESE PROBLEMS MADE IT FOR YOU TO DO YOUR WORK, TAKE CARE OF THINGS AT HOME, OR GET ALONG WITH OTHER PEOPLE: NOT DIFFICULT AT ALL
5. BEING SO RESTLESS THAT IT IS HARD TO SIT STILL: 0
4. TROUBLE RELAXING: 0
1. FEELING NERVOUS, ANXIOUS, OR ON EDGE: 0
6. BECOMING EASILY ANNOYED OR IRRITABLE: 0
7. FEELING AFRAID AS IF SOMETHING AWFUL MIGHT HAPPEN: 0
3. WORRYING TOO MUCH ABOUT DIFFERENT THINGS: 0
2. NOT BEING ABLE TO STOP OR CONTROL WORRYING: 0
GAD7 TOTAL SCORE: 0

## 2023-04-21 ENCOUNTER — TELEPHONE (OUTPATIENT)
Dept: ORTHOPEDIC SURGERY | Age: 33
End: 2023-04-21